# Patient Record
Sex: FEMALE | URBAN - METROPOLITAN AREA
[De-identification: names, ages, dates, MRNs, and addresses within clinical notes are randomized per-mention and may not be internally consistent; named-entity substitution may affect disease eponyms.]

---

## 2021-03-30 ENCOUNTER — TELEPHONE (OUTPATIENT)
Dept: FAMILY MEDICINE CLINIC | Facility: CLINIC | Age: 26
End: 2021-03-30

## 2021-03-30 NOTE — TELEPHONE ENCOUNTER
Patient called and she said about 10 years ago she used to see Dr. Schofield. She has a form  that needs to be signed off on that she is up to date with all her immunizations. Please advise if you still have those or where she can get the information.   Call [phone]